# Patient Record
Sex: FEMALE | Race: WHITE | Employment: OTHER | URBAN - METROPOLITAN AREA
[De-identification: names, ages, dates, MRNs, and addresses within clinical notes are randomized per-mention and may not be internally consistent; named-entity substitution may affect disease eponyms.]

---

## 2017-09-26 ENCOUNTER — HOSPITAL ENCOUNTER (OUTPATIENT)
Dept: MAMMOGRAPHY | Age: 72
Discharge: HOME | End: 2017-09-26
Attending: NURSE PRACTITIONER
Payer: MEDICARE

## 2017-09-26 DIAGNOSIS — Z12.31 VISIT FOR SCREENING MAMMOGRAM: ICD-10-CM

## 2017-09-26 PROCEDURE — 77063 BREAST TOMOSYNTHESIS BI: CPT

## 2018-08-03 ENCOUNTER — HOSPITAL ENCOUNTER (OUTPATIENT)
Dept: NUTRITION | Age: 73
Discharge: HOME | End: 2018-08-03
Payer: MEDICARE

## 2018-08-03 PROCEDURE — 97802 MEDICAL NUTRITION INDIV IN: CPT

## 2018-08-06 NOTE — PROGRESS NOTES
[address] [place] [address] 51, 45 [facility], [place], 70 [facility]  Phone: [phone]  Fax: [phone]   Nutrition Assessment  Medical Nutrition Therapy   Outpatient Initial Evaluation         Patient Name: Sheila Givens : 1945   Treatment Diagnosis: DM2   Referral Source: Alverto Manning MD [facility]): 2018     Gender: female Age: 74 y.o. Ht: 66 in Wt:  157.8 lb  kg   BMI: 25.5 BMR   Male  BMR Female 1227   Anthropometrics Assessment: Pt is slightly overweight. Past Medical History includes: HTN, hypothyroidism, overweight, osteopenia     Pertinent Medications:   HCTZ, Diltiazem XR, Metformin, MVI, biotin, Osteo Bi-flex, Ocusight, Vit C, Calcium + D3, Jose probiotic     Biochemical Data:   No results found for: HBA1C, HGBE8, RZC5YMLF, VDO0FQJT  No results found for: CHOL, CHOLPOCT, CHOLX, CHLST, CHOLV, HDL, HDLPOC, LDL, LDLCPOC, LDLC, DLDLP, VLDLC, VLDL, TGLX, TRIGL, TRIGP, TGLPOCT, CHHD, CHHDX  No results found for: GPT, ALT, SGOT, GGT, GGTP, AP, APIT, APX, CBIL, TBIL, TBILI  No results found for: GRACIE, CREAPOC, ACREA, CREA, REFC3, REFC4  No results found for: BUN, BUNPOC, IBUN, MBUNV, BUNV  No results found for: MCACR, MCA1, MCA2, MCA3, MCAU, MCAU2, MCALPOCT     Subjective/Assessment:   Pt in today r/t elevated A1C. She states that she has a sugar addiction and needs to get her A1C under control. She has attended classes previously and states that she knows what to do but cannot control herself around sweets. For exercise, she was walking but has stopped that at this time. She doesn't usually eat out at restaurants. She has cut junk food out at night when she previously ate junk food in front of the television. Her last A1C was 7.2 and she also reports high BP. She lives alone and cooks for herself.      Current Eating Patterns: B: Bagel with melted cheese or oatmeal or eggs or chicken soup  L: sandwich on whole grain bread  D: sometimes cooks, frozen dinner 2 times/wk  She drinks low sodium V8 and water throughout the day. She also loves salad and will have only that for lunch or dinner sometimes. In the am, she has a cup of coffee with a splash of skim milk. Estimate Needs   Calories:  1400 Protein: 105 Carbs: 123 Fat: 54   Kcal/day  g/day  g/day  g/day        percent: 25  45  30               Education & Recommendations provided: Educated pt on the pathophysiology of Type II Diabetes and insulin resistance and the rationale for dietary modification and increased activity. Educated patient on all sources of carbohydrates found in foods and  encouraged  no more than 30-45 g/meal and 15-20 g/snack. Discussed the Healthy Plate Method and appropriate portion sizes of different food groups. Explained the importance of combining carbohydrates with protein at meals and reviewed foods that contain each nutrient. Emphasized the importance of consistent meal time intervals throughout the day to improve metabolism. Explained calorie density and empty calories to assist pt with understanding portion sizes and limiting excess calories. Discussed microbiome and the possible link to sugar cravings.     Handouts Provided: [x]  Carbohydrates  [x]  Protein  []  Fiber  []  Serving Sizes  [x]  Meal and Snack Ideas  []  Food Journals [x]  Diabetes  []  Cholesterol  [x]  Sodium  [x]  Gen Nutr Guidelines  []  SBGM Guidelines  [x]  Others: inflammation   Information Reviewed with: Pt   Readiness to Change Stage: []  Pre-contemplative    []  Contemplative  []  Preparation               [x]  Action                  []  Maintenance   Potential Barriers to Learning: []  Decline in memory    []  Language barrier   []  Other:  []  Emotional                  []  Limited mobility  []  Lack of motivation     [] Vision, hearing or cognitive impairment   Expected Compliance: Fair due to patient's inability to remain motivated but willingness to put forth the effort. Nutritional Goal - To promote lifestyle changes to result in:    []  Weight loss  [x]  Improved diabetic control  []  Decreased cholesterol levels  []  Decreased blood pressure  []  Weight maintenance []  Preventing any interactions associated with food allergies  []  Adequate weight gain toward goal weight  []  Other:        Patient Goals:  SMART goals 1. Limit frozen meals to twice/wk  2. Line up 4 bottles of water to reach goal of 64 oz per day (personal goal)  3. Begin walking for 30 minutes 3-5 days/wk  4. Start stretch band and/or 2 lb weights 2 times/wk  5. Eat meal or snack within 1-2 hours of waking  6. Eat something about every 4 hours  7.  Keep protein at about 30 g/meal and CHO 30-45 g/meal     Dietitian Signature: Diego Patel MS, RD Date: 8/6/2018   Follow-up: Fri Sep 7th @ 10:30 am Time: 10:26 AM

## 2018-09-07 ENCOUNTER — HOSPITAL ENCOUNTER (OUTPATIENT)
Dept: NUTRITION | Age: 73
Discharge: HOME | End: 2018-09-07
Payer: MEDICARE

## 2018-09-07 PROCEDURE — 97803 MED NUTRITION INDIV SUBSEQ: CPT

## 2018-09-07 NOTE — PROGRESS NOTES
NUTRITION  FOLLOW-UP TREATMENT NOTE Patient Name: Chano Nash         Date: 2018 : 1945    YES/NO Patient  Verified Diagnosis: DM2 In time:   10:30          Out time:   11:00 Total Treatment Time (min):   30 min [id number] Changes in medication or medical history? Any new allergies, surgeries or procedures? NO    If yes, update Summary List  
Pt in today for follow up. She has made many changes to her lifestyle in order to see a decrease in BS levels. Per pt, she has increased water intake, increased exercise, has been making healthier food choices, added a probiotic, and is now eating breakfast. She has not been keeping sweets in the house and plans to use the [facility] kitchen/freezer when baking so as not to store sweets that she states she will eat until she feels sick. Food recall: B: boiled egg with cantaloupe. L: HT salad bar with mozz. D: same. She has been drinking water and tea sweetened with Stevia. Current Wt: 157.4 Previous Wt: 157.8 Wt Change: -0.4# Achievement of Goals: 1. Limit frozen meals to twice per week = met, continue 2. Line up 4 bottles of water to reach goal of 64 oz per day = met, continue 3. Begin walking for 30 minutes 3-5 days/wk = met, continue 4. Start stretch band and/or 2# wts 2 times/wk = not met, continue 5. Eat meal or snack within 1-2 hours of waking = met, continue 6. Eat something about every 4 hrs = met, continue 7. Keep protein at about 30 g/meal and CHO 30-45 g/meal = met, revised (protein goal is now 15-30 g/meal) Patient Education:  [x]  Review current plan with patient  
[]  Other:   
Handouts/ Information Provided: []  Carbohydrates 
[]  Protein []  Fiber 
[]  Serving Sizes []  Fluids 
[]  General guidelines []  Diabetes []  Cholesterol 
[]  Sodium []  SBGM []  Food Journals 
[]  Others:   
 
New Patient Goals: 1. Call [employer] for price match on probiotics and protein shakes 2. Walk 30 minutes 3-5 days/wk (or yoga) 3. Stretch bands and/or 2# wts 2 days/wk PLAN [x]  Continue on current plan []  Follow-up PRN  
[]  Discharge due to :   
[x]  Next appt: Friday Oct 19th @ 10 am  
 
Dietitian: Gabriel Martell MS, RD Date: 9/7/2018 Time: 11:53 AM

## 2018-10-19 ENCOUNTER — HOSPITAL ENCOUNTER (OUTPATIENT)
Dept: NUTRITION | Age: 73
Discharge: HOME | End: 2018-10-19
Payer: MEDICARE

## 2018-10-19 PROCEDURE — 97803 MED NUTRITION INDIV SUBSEQ: CPT

## 2018-10-19 NOTE — PROGRESS NOTES
NUTRITION  FOLLOW-UP TREATMENT NOTE Patient Name: Merlin Stroud         Date: 10/19/2018 : 1945    YES/NO Patient  Verified Diagnosis: DM2 In time:   10            Out time:   10:30 Total Treatment Time (min):  30 min [address] Changes in medication or medical history? Any new allergies, surgeries or procedures? YES/NO    If yes, update Summary List  
Pt in today for follow up. Pt states that her A1C decreased to 6.8 (2018) from 7.2 (7/3/18). She has lost 5.6# since her last visit at the beginning of Sep. Per pt she continued to be serious about what she ate. She's been walking and doing yoga when she can't walk consistently. She has also increased her water intake. She has been eating 3 meals per day and 1 snack in the evening. Food recall: B: cottage cheese with fruit, walnuts, and cinnamon. L: tries to have salad with rotisserie chicken. D: if not salad will have 1/2 bagel with swiss cheese and saurkraut or chicken noodle soup (homemade). Current Wt: 151.8 Previous Wt: 157.4 Wt Change: -5.6# Achievement of Goals: 1. Call [employer] for price match on probiotics and protein shakes = met, discontinue (but continue on probiotics) 2. Walk 30 minutes 3-5 days/wk (or yoga) = met, continue 3. Stretch bands and/or 2# wts 2 days/wk = not met, continue Patient Education:  [x]  Review current plan with patient  
[]  Other:   
Handouts/ Information Provided: []  Carbohydrates 
[]  Protein []  Fiber 
[]  Serving Sizes []  Fluids 
[]  General guidelines []  Diabetes []  Cholesterol 
[]  Sodium []  SBGM []  Food Journals 
[x]  Others: Goals New Patient Goals: 1. Have protein before cooked or cold cereal (such as oatmeal) 2. Buy rotisserie chicken for salad 3. Add protein to ruel (cheese, seeds, nuts, or protein shake) 4. Add weights or stretch band 20 minutes 1-2 days/week PLAN [x]  Continue on current plan []  Follow-up PRN  
 []  Discharge due to :   
[x]  Next appt: 12/21/18 @ 10 am  
 
Dietitian: Fabiola Baird MS, RD Date: 10/19/2018 Time: 9:53 AM

## 2019-05-02 ENCOUNTER — HOSPITAL ENCOUNTER (OUTPATIENT)
Dept: PHYSICAL THERAPY | Age: 74
Discharge: HOME | End: 2019-05-02
Payer: MEDICARE

## 2019-05-02 PROCEDURE — 97162 PT EVAL MOD COMPLEX 30 MIN: CPT

## 2019-05-02 NOTE — PROGRESS NOTES
[facility] PHYSICAL THERAPY 
[address] [place] [address] Natasha Norris [address],[place], 70 [facility] - Phone: [phone]  Fax: [phone] PLAN OF CARE / STATEMENT OF MEDICAL NECESSITY FOR PHYSICAL THERAPY SERVICES Patient Name: Latoya Lowry : 1945 Medical  
Diagnosis: Unsteadiness on feet [R26.81] Treatment Diagnosis: Unsteadiness on feet [R26.81] Onset Date:  Referral Source: Karen Tineo MD [facility]): 2019 Prior Hospitalization: See medical history Provider #: [id number] Prior Level of Function: No difficulty with ambulation Comorbidities: HTN, DM Medications: Verified on Patient Summary List  
The Plan of Care and following information is based on the information from the initial evaluation.  
=========================================================================================== Assessment / key information:  Latoya Lowry is a 66 y.o.  yo female with Dx of Unsteadiness on feet [R26.81]. She reports the onset of dizziness/loss of balance after taking antibiotics in . She finished taking the antibiotics on 3/23/19, however continues to have difficulty with changing direction while walking. Objective Findings:  Manual Muscle Testing: B hip abd are Reduced. All other LE strength are WNL. Special Test: SL Balance: >10s bilaterally. Standing with with repetitive cervical rot;  C/o dizziness. Standing on 2 pillows with repetitive cervical rot; Unable to maintain balance. Pt instructed in HEP and will f/u in clinic for PT.=========================================================================================== Eval Complexity: History MEDIUM  Complexity : 1-2 comorbidities / personal factors will impact the outcome/ POC ;  Examination  MEDIUM Complexity : 3 Standardized tests and measures addressing body structure, function, activity limitation and / or participation in recreation ; Presentation MEDIUM Complexity : Evolving with changing characteristics ; Decision Making MEDIUM Complexity : FOTO score of 26-74; Overall Complexity MEDIUM Problem List: impaired gait/ balance Treatment Plan may include any combination of the following: Neuromuscular re-education, Gait/balance training, Manual therapy, Patient education, Self Care training and Home safety training Patient / Family readiness to learn indicated by: asking questions Persons(s) to be included in education: patient (P) Barriers to Learning/Limitations: None Measures taken, if barriers to learning:   
Patient Goal (s): Get back to walking Patient self reported health status: good Rehabilitation Potential: good ? Short Term Goals: To be accomplished in  1-2  weeks: 
1. Independent with HEP. 2. Will rate >/= +3 on Global Rating of Change to show overall improvement. ? Long Term Goals: To be accomplished in  3-4  weeks: 1. Pt will report >/= 75% improvement to assist with ADLs 2. Increase FOTO score by 20% to show functional improvment. 3.  Will rate >/= +5 on Global Rating of Change and be prepared to DC to HEP. Frequency / Duration:   Patient to be seen  2-3  times per week for 3-4  weeks: 
Patient / Caregiver education and instruction: self care and exercises Therapist Signature: Ashley Espinoza, DPT, OCS, SCS, CSCS Date: 5/2/2019 Certification Period: 5/2/19 - 7/31/19 Time: 9:26 AM  
=========================================================================================== I certify that the above Physical Therapy Services are being furnished while the patient is under my care. I agree with the treatment plan and certify that this therapy is necessary. Physician Signature:       Date:      Time:  Please sign and return to [facility] [place] or you may fax the signed copy to [phone]. Thank you.

## 2019-05-02 NOTE — PROGRESS NOTES
PHYSICAL THERAPY - DAILY TREATMENT NOTE Patient Name: Braulio Ramirez        Date: 2019 : 1945   YES Patient  Verified Visit #:     Insurance: Payor: Kathia Jones / Plan: Burt Rao / Product Type: Medicare / In time: 8:50 Out time: 9:05 Total Treatment Time: 15 Medicare Time Tracking (below) Total Timed Codes (min):  na 1:1 Treatment Time:  na  
TREATMENT AREA =  No admission diagnoses are documented for this encounter. SUBJECTIVE Pain Level (on 0 to 10 scale):   
Medication Changes/New allergies or changes in medical history, any new surgeries or procedures? NO    If yes, update Summary List  
Subjective Functional Status/Changes:  []  No changes reported SEE IE  
 
  
 
OBJECTIVE 
 
 
 min Patient Education:  YES  Reviewed HEP []  Progressed/Changed HEP based on: Other Objective/Functional Measures: SEE IE 
  
Post Treatment Pain Level (on 0 to 10) scale:    
ASSESSMENT Assessment/Changes in Function: SEE IE 
  
[]  See Progress Note/Recertification Patient will continue to benefit from skilled PT services to modify and progress therapeutic interventions, address functional mobility deficits, address ROM deficits, address strength deficits, analyze and address soft tissue restrictions, analyze and cue movement patterns, analyze and modify body mechanics/ergonomics and assess and modify postural abnormalities to attain remaining goals. Progress toward goals / Updated goals: PLAN 
[]  Upgrade activities as tolerated YES Continue plan of care  
[]  Discharge due to :   
[]  Other:   
 
Therapist: Mayra Gan, PT, OCS, SCS, CSCS Date: 2019 Time: 9:08 AM  
 
 
No future appointments.

## 2019-05-10 ENCOUNTER — APPOINTMENT (OUTPATIENT)
Dept: PHYSICAL THERAPY | Age: 74
End: 2019-05-10
Payer: MEDICARE

## 2019-05-13 ENCOUNTER — HOSPITAL ENCOUNTER (OUTPATIENT)
Dept: PHYSICAL THERAPY | Age: 74
Discharge: HOME | End: 2019-05-13
Payer: MEDICARE

## 2019-05-13 PROCEDURE — 97112 NEUROMUSCULAR REEDUCATION: CPT

## 2019-05-13 NOTE — PROGRESS NOTES
PHYSICAL THERAPY - DAILY TREATMENT NOTE Patient Name: Selvin Nassar        Date: 2019 : 1945   YES Patient  Verified Visit #:     Insurance: Payor: Sukhwinder Trammell / Plan[place] / Product Type: Medicare / In time: 10:55 Out time: 11:40 Total Treatment Time: 45 BCBS and Medicare Time Tracking (below) Total Timed Codes (min):  45 1:1 Treatment Time:  45 TREATMENT AREA =  Unsteadiness on feet [R26.81] SUBJECTIVE Pain Level (on 0 to 10 scale):  0  / 10 Medication Changes/New allergies or changes in medical history, any new surgeries or procedures? NO    If yes, update Summary List  
Subjective Functional Status/Changes:  []  No changes reported This happened a few months ago when I had a bladder infection called macrobid. My thoughts and words aren't matching. Its not constant. Im 99.5% sure its that drug and I had 2 serious falls in the mean time. Was in the ER last Wednesday. I was talking to a friend holding the phone, and the vacuum cord was between my feet and I fell forward onto the left side. My head isnt quite right, its moving and I'm not. X-ray for hip negative for fractures, CT scan of head Denies spinning, intermittent dizziness 45 min Neuro Re-education: Education in vestibular rehabilitation program, anatomy and physiology, VSE in sitting and purpose of vestibular adaptation exercise Rationale:      
 min Patient Education:  YES  Reviewed HEP []  Progressed/Changed HEP based on: Other Objective/Functional Measures: 
Physical Therapy Evaluation - Vestibular Posture:  Not Assessed C/S ROM: Not Assessed Strength:   [x] Limited  MANUEL Hip ABD Optional Tests: 
Sensation: Not Assessed TC Proprioception: Not Assessed TC Coordination Testing: Not Assessed TC Oculomotor Tests: (Fixation Present) Not Assessed TC 
 
BPPV Assessment:  Not Assessed TC 
  
BPPV Special Tests: Not Assessed TC 
 
 Vestibular Functional Assessment: 
     Dynamic Gait Index [] Neg     [x] Pos    Score: 15/24 (<18/24) Balance Standard Testing (Eyes Open/Eyes Closed - EO/EC) Romberg   [] [facility]    [] Pos    Describe: 30/30 Stand on Foam  [] [facility]    [] Pos    Describe: 30/5 Standing on Rail  [] [facility]    [] Pos    Describe: 30/6 Sharpened Romberg [] WFL    [] Pos    Describe: R 30/4; R00/9 Single Leg Stand  [] [facility]    [] Pos    Describe: 2/3 Post Treatment Pain Level (on 0 to 10) scale:   0  / 10 ASSESSMENTAssessment/Changes in Function: Pt presented to PT services with decreased MANUEL strength, flexibility, decreased static/dynamic balance, impaired/ataxic gait and would benefit from PT services in order to address the above impairments. []  See Progress Note/Recertification Patient will continue to benefit from skilled therapy to address remaining functional deficits: See POC Progress toward goals / Updated goals: 
See POC PLAN [x]  Upgrade activities as tolerated YES Continue plan of care  
[]  Discharge due to :   
[]  Other:   
 
Therapist: Amirta Muller Date: 5/13/2019 Time: 8:41 AM

## 2019-05-21 ENCOUNTER — HOSPITAL ENCOUNTER (OUTPATIENT)
Dept: PHYSICAL THERAPY | Age: 74
Discharge: HOME | End: 2019-05-21
Payer: MEDICARE

## 2019-05-21 PROCEDURE — 97112 NEUROMUSCULAR REEDUCATION: CPT

## 2019-05-21 PROCEDURE — 97116 GAIT TRAINING THERAPY: CPT

## 2019-05-21 NOTE — PROGRESS NOTES
PHYSICAL THERAPY - DAILY TREATMENT NOTE    Patient Name: Elise Alanis        Date: 2019  : 1945   YES Patient  Verified  Visit #:   3     10  Insurance: Payor: Rachel Deras / Plan: [place] & [place] / Product Type: Medicare /      In time: 8:54 Out time: 9:38   Total Treatment Time: 40     BCBS and Medicare Time Tracking (below)   Total Timed Codes (min):  44 1:1 Treatment Time:  44     TREATMENT AREA =  Unsteadiness on feet [R26.81]  SUBJECTIVE  Pain Level (on 0 to 10 scale):  0  / 10   Medication Changes/New allergies or changes in medical history, any new surgeries or procedures? NO    If yes, update Summary List   Subjective Functional Status/Changes:  []  No changes reported     Pt states \"I started to notice that the forward bending makes me feel dizzy\"         OBJECTIVE  Modalities Rationale:   N/a    32 min Neuro Re-education: Static standing balance EO/EC   Rationale:   Improve standing proprioception to improve patients ability to balance in the shower and stand at night    12 min Gait Training: Ambulation with head movements, added BKW, amb with HHT/VHT   Rationale:    Improve gait mechanics to walk independently and safely in the grocery store         min Patient Education:  YES  Reviewed HEP   []  Progressed/Changed HEP based on: Other Objective/Functional Measures: Added VSE/VVI, static standing balance, and gait training   Post Treatment Pain Level (on 0 to 10) scale:   0  / 10     ASSESSMENT  Assessment/Changes in Function:     Added VSE in sitting with good tolerance. VC for amount of head turn and speed. VC in for proper form for VVI for improved coordination.  Increaed M/L sway with heel to bunion EC.    []  See Progress Note/Recertification   Patient will continue to benefit from skilled PT services to modify and progress therapeutic interventions, address functional mobility deficits, address ROM deficits, address strength deficits, analyze and address soft tissue restrictions, analyze and cue movement patterns, analyze and modify body mechanics/ergonomics and assess and modify postural abnormalities to attain remaining goals. Progress toward goals / Updated goals:    First visit after initial evaluation. Progress tx per POC.         PLAN  [x]  Upgrade activities as tolerated YES Continue plan of care   []  Discharge due to :    []  Other:      Therapist: Park De Jesus DPT     Date: 5/21/2019 Time: 9:17 AM     Future Appointments   Date Time Provider Elle Valencia   5/23/2019  9:15 AM [facility]   5/28/2019  8:15 AM [facility]   6/4/2019  3:30 PM [facility]   6/11/2019  8:30 AM [facility]   6/18/2019  8:30 AM [facility]   6/25/2019  8:30 AM [facility]

## 2019-05-23 ENCOUNTER — HOSPITAL ENCOUNTER (OUTPATIENT)
Dept: PHYSICAL THERAPY | Age: 74
Discharge: HOME | End: 2019-05-23
Payer: MEDICARE

## 2019-05-23 PROCEDURE — 97116 GAIT TRAINING THERAPY: CPT

## 2019-05-23 PROCEDURE — 97112 NEUROMUSCULAR REEDUCATION: CPT

## 2019-05-23 NOTE — PROGRESS NOTES
PHYSICAL THERAPY - DAILY TREATMENT NOTE    Patient Name: Chase Sorensen        Date: 2019  : 1945   YES Patient  Verified  Visit #:   4  of   10  Insurance: Payor: Yusef Drafts / Plan: [place] & [place] / Product Type: Medicare /      In time: 9:16 Out time: 9:49   Total Treatment Time: 35     BCBS and Medicare Time Tracking (below)   Total Timed Codes (min):  33 1:1 Treatment Time:  33     TREATMENT AREA =  Unsteadiness on feet [R26.81]  SUBJECTIVE  Pain Level (on 0 to 10 scale):  0  / 10   Medication Changes/New allergies or changes in medical history, any new surgeries or procedures? NO    If yes, update Summary List   Subjective Functional Status/Changes:  []  No changes reported     Pt states \"I' am just a little wobbly today and thinking ill bring the cane with me, by the time I finished the eye exercises this morning and I did feel a little dizzy in my head than usual\"         OBJECTIVE  Modalities Rationale:   N/a    21 min Neuro Re-education: Static standing balance EO/EC   Rationale:   Improve standing proprioception to improve patients ability to balance in the shower and stand at night    12 min Gait Training: Ambulation with head movements, added mod tandem   Rationale:    Improve gait mechanics to walk independently and safely in the grocery store        min Patient Education:  YES  Reviewed HEP   []  Progressed/Changed HEP based on: Other Objective/Functional Measures: Added modified tandem gait   Post Treatment Pain Level (on 0 to 10) scale:   0  / 10     ASSESSMENT  Assessment/Changes in Function:     Held VVI and updated HEP to VSE only secondary to improved tolerance. Improved performance on MSR heel to bunion EC able to maintain full duration with decreased trunk sway. Progressed 2\" to romberg on foam EC with increased challenge and left trunk sway.      []  See Progress Note/Recertification   Patient will continue to benefit from skilled PT services to modify and progress therapeutic interventions, address functional mobility deficits, address ROM deficits, address strength deficits, analyze and address soft tissue restrictions, analyze and cue movement patterns, analyze and modify body mechanics/ergonomics and assess and modify postural abnormalities to attain remaining goals. Progress toward goals / Updated goals:    Progressing towards STG 1.   1. Independent with HEP.  Goal in progress - HEP established and issued (5/21/19)       PLAN  [x]  Upgrade activities as tolerated YES Continue plan of care   []  Discharge due to :    []  Other:      Therapist: Natalie Thorne DPT     Date: 5/23/2019 Time: 9:21 AM     Future Appointments   Date Time Provider Elle Valencia   5/28/2019  8:15 AM [facility]   6/4/2019  3:30 PM [facility]   6[facility]11[facility]   6/18/2019  8:30 AM [facility]   6/25/2019  8:30 AM [facility]

## 2019-05-28 ENCOUNTER — HOSPITAL ENCOUNTER (OUTPATIENT)
Dept: PHYSICAL THERAPY | Age: 74
Discharge: HOME | End: 2019-05-28
Payer: MEDICARE

## 2019-05-28 PROCEDURE — 97112 NEUROMUSCULAR REEDUCATION: CPT

## 2019-05-28 PROCEDURE — 97116 GAIT TRAINING THERAPY: CPT

## 2019-05-28 NOTE — PROGRESS NOTES
[address] PHYSICAL THERAPY  [address] -   Phone: [phone]  Fax: [phone]  [x]  PROGRESS NOTE  []   [facility] SUMMARY  Patient Name: Chandra Alfaro : 1945   Treatment Diagnosis: Unsteadiness on feet [R26.81]     Referral Source: Shraddha Torrez MD     Date of Initial Visit: 2019 Attended Visits: 5 Missed Visits: 0     SUMMARY OF TREATMENT  Therapeutic exercises including strengthening, vestibular adaptation card exercises, balance training, gait training, fall prevention strategies, and HEP instruction. CURRENT STATUS  The pt has progressed slowly but well with therapy, consistently reporting decreased dizziness and improved stability with functional mobility. Currently, the patient's main complaint is assistance from family member or AD with ambulation in crowded areas for safety. Patient continues to demonstrate impaired use of vestibular input and hip/ankle balance strategies. Patient scored 21/30 on FGA indicating increased risk of falls with ambulation. Patient scored 22/100 on DHI indcating decreased functional mobility and quality of life secondary to dizziness. Pt reports improved ability to safely transfer in and out of shower and perform household ambulation without SPC since starting PT services. Pt would benefit from continued PT services in order to decrease dizziness, improve static/dynamic balance, gait training, and address remaining impairments. Goal/Measure of Progress Goal Met? 1.  Establish HEP to prevent further disability. Status at last Eval: Goal Established Current Status: I with HEP               yes   2. Will rate >/= +3 on Global Rating of Change to show overall improvement. Status at last Eval: Goal Established Current Status: (+2) A little Better Progressing      New Goals to be achieved in __3-4__  Weeks:  1.   Patient to be independent & compliant with progressive HEP and vestibular taper in preparation for D/C.   2.  Patient will be able to maintain MSR heel to bunion for 30 seconds eyes closed to increase safety with showering. 4.  Patient to improve score on FGA to 24/30 indicating decreased fall risk with ambulation. 5.  Increase FOTO score by 20% to show functional improvment. 6.  Patient will be able to maintain Rail stance for >/=15 seconds eyes closed to increase safety with showering. RECOMMENDATIONS  Continue therapy with the following recommendations: 1-2x per week for 4-5 weeks    If you have any questions/comments please contact us directly at [phone]   Thank you for allowing us to assist in the care of your patient. Therapist Signature: Inez Priest DPT Date: 5/28/2019     Time: 8:24 AM   NOTE TO PHYSICIAN:  PLEASE COMPLETE THE ORDERS BELOW AND FAX TO   [facility]: [phone]  If you are unable to process this request in 24 hours please contact our office: [phone]    ___ I have read the above report and request that my patient continue as recommended.   ___ I have read the above report and request that my patient continue therapy with the following changes/special instructions:_________________________________________________________   ___ I have read the above report and request that my patient be discharged from therapy.      Physician Signature:        Date:       Time:

## 2019-05-28 NOTE — PROGRESS NOTES
PHYSICAL THERAPY - DAILY TREATMENT NOTE    Patient Name: Karey Araiza        Date: 2019  : 1945   YES Patient  Verified  Visit #:   4  of   10  Insurance: Payor: Celina Mauricio / Plan: [place] MEDICARE PART A & B / Product Type: Medicare /      In time: 8:16 Out time: 9:18   Total Treatment Time: 58     BCBS and Medicare Time Tracking (below)   Total Timed Codes (min):  62 1:1 Treatment Time:  62     TREATMENT AREA =  Unsteadiness on feet [R26.81]  SUBJECTIVE  Pain Level (on 0 to 10 scale):  0  / 10   Medication Changes/New allergies or changes in medical history, any new surgeries or procedures? NO    If yes, update Summary List   Subjective Functional Status/Changes:  []  No changes reported     Pt states \"I' am light headed, my BP might be up bc im anxious\"         OBJECTIVE  Modalities Rationale:   N/a    46 min Neuro Re-education: Static standing balance EO/EC   Rationale:   Improve standing proprioception to improve patients ability to balance in the shower and stand at night    16 min Gait Training: Ambulation with head movements, added mod tandem   Rationale:    Improve gait mechanics to walk independently and safely in the grocery store      min Patient Education:  YES  Reviewed HEP   []  Progressed/Changed HEP based on: Other Objective/Functional Measures:     BP = 120/70   Post Treatment Pain Level (on 0 to 10) scale:   0  / 10     ASSESSMENT  Assessment/Changes in Function:     See PN     []  See Progress Note/Recertification   Patient will continue to benefit from skilled PT services to modify and progress therapeutic interventions, address functional mobility deficits, address ROM deficits, address strength deficits, analyze and address soft tissue restrictions, analyze and cue movement patterns, analyze and modify body mechanics/ergonomics and assess and modify postural abnormalities to attain remaining goals.    Progress toward goals / Updated goals:    See PN       PLAN  [x] Upgrade activities as tolerated YES Continue plan of care   []  Discharge due to :    []  Other:      Therapist: Nancy Adams DPT     Date: 5/28/2019 Time: 9:21 AM     Future Appointments   Date Time Provider Elle Valencia   6/4/2019  3:30 PM Ashok Flores [facility]   6/11/2019  8:30 AM Anthony Romano PTA [facility]   6/18/2019  8:30 AM Anthony Romano PTA [facility]   6/25/2019  8:30 AM [facility] PT SHANDA 2 [facility]

## 2019-06-04 ENCOUNTER — HOSPITAL ENCOUNTER (OUTPATIENT)
Dept: PHYSICAL THERAPY | Age: 74
Discharge: HOME | End: 2019-06-04
Payer: MEDICARE

## 2019-06-04 PROCEDURE — 97112 NEUROMUSCULAR REEDUCATION: CPT

## 2019-06-04 PROCEDURE — 97116 GAIT TRAINING THERAPY: CPT

## 2019-06-04 NOTE — PROGRESS NOTES
PHYSICAL THERAPY - DAILY TREATMENT NOTE    Patient Name: Charlotte Llanes        Date: 2019  : 1945   YES Patient  Verified  Visit #:   (0) 1  of   8-10  Insurance: Payor: Darron Many / Plan: [place] MEDICARE PART A & B / Product Type: Medicare /      In time: 3:32 pm Out time: 4:00pm   Total Treatment Time: 29     BCBS and Medicare Time Tracking (below)   Total Timed Codes (min):  28 1:1 Treatment Time:  28     TREATMENT AREA =  Unsteadiness on feet [R26.81]  SUBJECTIVE  Pain Level (on 0 to 10 scale):  0  / 10   Medication Changes/New allergies or changes in medical history, any new surgeries or procedures? NO    If yes, update Summary List   Subjective Functional Status/Changes:  []  No changes reported     Pt states \"Ewa started walking I stick close to home and carry the cane, Im feel like I'm doing better\"         OBJECTIVE  Modalities Rationale:   N/a    20 min Neuro Re-education: Static standing balance EO/EC   Rationale:   Improve standing proprioception to improve patients ability to balance in the shower and stand at night    8 min Gait Training: Ambulation with head movements, added amb with EC and lateral ladi navigation    Rationale:    Improve gait mechanics to walk independently and safely in the grocery store       min Patient Education:  YES  Reviewed HEP   []  Progressed/Changed HEP based on: Other Objective/Functional Measures:     SR tennis ball toss   Post Treatment Pain Level (on 0 to 10) scale:   0  / 10     ASSESSMENT  Assessment/Changes in Function:     Progressed vestibular adaptation to standing with background. Good tolerance for progression into heel to bunion EC, however, increased hip strategy.       []  See Progress Note/Recertification   Patient will continue to benefit from skilled PT services to modify and progress therapeutic interventions, address functional mobility deficits, address ROM deficits, address strength deficits, analyze and address soft tissue restrictions, analyze and cue movement patterns, analyze and modify body mechanics/ergonomics and assess and modify postural abnormalities to attain remaining goals. Progress toward goals / Updated goals:    Progressing towards newly established LTGs.       PLAN  [x]  Upgrade activities as tolerated YES Continue plan of care   []  Discharge due to :    []  Other:      Therapist: Amparo Rodas DPT     Date: 6/4/2019 Time: 1:38 PM     Future Appointments   Date Time Provider Elle Valencia   6/4/2019  3:30 PM Ascencion Freitas [facility]   6/11/2019  8:30 AM Larry Robles PTA [facility]   6/18/2019  8:30 AM Larry Robles PTA [facility]   6/25/2019  8:30 AM [facility] PT SHANDA 2 [facility]

## 2019-06-11 ENCOUNTER — APPOINTMENT (OUTPATIENT)
Dept: PHYSICAL THERAPY | Age: 74
End: 2019-06-11
Payer: MEDICARE

## 2019-06-13 ENCOUNTER — HOSPITAL ENCOUNTER (OUTPATIENT)
Dept: PHYSICAL THERAPY | Age: 74
Discharge: HOME | End: 2019-06-13
Payer: MEDICARE

## 2019-06-13 PROCEDURE — 97116 GAIT TRAINING THERAPY: CPT

## 2019-06-13 PROCEDURE — 97112 NEUROMUSCULAR REEDUCATION: CPT

## 2019-06-13 NOTE — PROGRESS NOTES
PHYSICAL THERAPY - DAILY TREATMENT NOTE    Patient Name: Chano Nash        Date: 2019  : 1945   YES Patient  Verified  Visit #:   (8) 6  of   8-10  Insurance: Payor: Madelyn Beard / Plan: [place] MEDICARE PART A & B / Product Type: Medicare /      In time: 3:30 pm Out time: 4:13 pm   Total Treatment Time: 37     BCBS and Medicare Time Tracking (below)   Total Timed Codes (min):  43 1:1 Treatment Time: 34     TREATMENT AREA =  Unsteadiness on feet [R26.81]  SUBJECTIVE  Pain Level (on 0 to 10 scale):  0  / 10   Medication Changes/New allergies or changes in medical history, any new surgeries or procedures? NO    If yes, update Summary List   Subjective Functional Status/Changes:  []  No changes reported     Pt states \"im doing good I started walking again\"         OBJECTIVE  Modalities Rationale:   N/a    35  (bill 26 ) min Neuro Re-education: Static standing balance EO/EC   Rationale:   Improve standing proprioception to improve patients ability to balance in the shower and stand at night    8 min Gait Training: Ambulation with head movements, added amb with EC and lateral ladi navigation    Rationale:    Improve gait mechanics to walk independently and safely in the grocery store     Billed With/As:   [] TE   [] TA   [x] Neuro   [] Self Care Patient Education: [x] Review HEP    [] Progressed/Changed HEP based on:   [] positioning   [] body mechanics   [] transfers   [] heat/ice application    [] other:       min Patient Education:  YES  Reviewed HEP   []  Progressed/Changed HEP based on: Other Objective/Functional Measures: Added nu step warm up and 2 way hip standing (no hands)   Post Treatment Pain Level (on 0 to 10) scale:   0  / 10     ASSESSMENT  Assessment/Changes in Function:     Progressed vestibular adaptation to standing with background. Progressed dynamic balance in standing with increased challenge on R LE.  Progressed SR to SLS arms at sides with increased ankle strategy []  See Progress Note/Recertification   Patient will continue to benefit from skilled PT services to modify and progress therapeutic interventions, address functional mobility deficits, address ROM deficits, address strength deficits, analyze and address soft tissue restrictions, analyze and cue movement patterns, analyze and modify body mechanics/ergonomics and assess and modify postural abnormalities to attain remaining goals. Progress toward goals / Updated goals:    Progressing towards LTG 2.   2. Patient will be able to maintain MSR heel to bunion for 30 seconds eyes closed to increase safety with showering.  Goal Met - MSR heel to GT EC = 30/30 sec     PLAN  [x]  Upgrade activities as tolerated YES Continue plan of care   []  Discharge due to :    []  Other:      Therapist: Jose Francisco Gomez DPT     Date: 6/13/2019 Time: 1:38 PM     Future Appointments   Date Time Provider Elle Valencia   6/13/2019  3:30 PM [address]   6/18/2019  9:30 AM [address]

## 2019-06-18 ENCOUNTER — APPOINTMENT (OUTPATIENT)
Dept: PHYSICAL THERAPY | Age: 74
End: 2019-06-18
Payer: MEDICARE

## 2019-06-18 ENCOUNTER — HOSPITAL ENCOUNTER (OUTPATIENT)
Dept: PHYSICAL THERAPY | Age: 74
Discharge: HOME | End: 2019-06-18
Payer: MEDICARE

## 2019-06-18 PROCEDURE — 97112 NEUROMUSCULAR REEDUCATION: CPT

## 2019-06-18 PROCEDURE — 97116 GAIT TRAINING THERAPY: CPT

## 2019-06-18 NOTE — PROGRESS NOTES
PHYSICAL THERAPY - DAILY TREATMENT NOTE    Patient Name: Gary Powers        Date: 2019  : 1945   YES Patient  Verified  Visit #:   (5) 3 of   8-10  Insurance: Payor: Hawk White / Plan: [place] MEDICARE PART A & B / Product Type: Medicare /      In time: 9:30 am Out time: 10:12 am   Total Treatment Time: 43     BCBS and Medicare Time Tracking (below)   Total Timed Codes (min):  42 1:1 Treatment Time: 42     TREATMENT AREA =  Unsteadiness on feet [R26.81]  SUBJECTIVE  Pain Level (on 0 to 10 scale):  0  / 10   Medication Changes/New allergies or changes in medical history, any new surgeries or procedures? NO    If yes, update Summary List   Subjective Functional Status/Changes:  []  No changes reported     Pt states \"I feel much better than I have, I feel like I am where I was before the anitbiotic got my confidence back\"         OBJECTIVE  Modalities Rationale:   N/a    30 min Neuro Re-education: Static standing balance EO/EC   Rationale:   Improve standing proprioception to improve patients ability to balance in the shower and stand at night    12 min Gait Training: Ambulation with head movements, added amb with EC and lateral ladi navigation    Rationale:    Improve gait mechanics to walk independently and safely in the grocery store     Billed With/As:   [] TE   [] TA   [x] Neuro   [] Self Care Patient Education: [x] Review HEP    [] Progressed/Changed HEP based on:   [] positioning   [] body mechanics   [] transfers   [] heat/ice application    [] other:       min Patient Education:  YES  Reviewed HEP   []  Progressed/Changed HEP based on:         Other Objective/Functional Measures:     Progressed romberg on foam to heel to arch EC   Post Treatment Pain Level (on 0 to 10) scale:   0  / 10     ASSESSMENT  Assessment/Changes in Function:     Patient has progressed well with PT services consistently reporting improved balance and functional mobility, thus patient is appropriate for D/C to home mgt of balance and gait training next visit. Discussed discharge planning, with patient reported agreeance. []  See Progress Note/Recertification   Patient will continue to benefit from skilled PT services to modify and progress therapeutic interventions, address functional mobility deficits, address ROM deficits, address strength deficits, analyze and address soft tissue restrictions, analyze and cue movement patterns, analyze and modify body mechanics/ergonomics and assess and modify postural abnormalities to attain remaining goals. Progress toward goals / Updated goals:    Progressing towards LTG 2.   2. Patient will be able to maintain MSR heel to bunion for 30 seconds eyes closed to increase safety with showering.  Goal Met - MSR heel to GT EC = 30/30 sec     PLAN  [x]  Upgrade activities as tolerated YES Continue plan of care   []  Discharge due to :    []  Other:      Therapist: Magaly Farley DPT     Date: 6/18/2019 Time: 1:38 PM     Future Appointments   Date Time Provider Elle Valencia   6/18/2019  9:30 AM Wilma Mallory

## 2019-06-25 ENCOUNTER — APPOINTMENT (OUTPATIENT)
Dept: PHYSICAL THERAPY | Age: 74
End: 2019-06-25
Payer: MEDICARE

## 2019-07-02 ENCOUNTER — HOSPITAL ENCOUNTER (OUTPATIENT)
Dept: PHYSICAL THERAPY | Age: 74
Discharge: HOME | End: 2019-07-02
Payer: MEDICARE

## 2019-07-02 PROCEDURE — 97116 GAIT TRAINING THERAPY: CPT

## 2019-07-02 PROCEDURE — 97112 NEUROMUSCULAR REEDUCATION: CPT

## 2019-07-02 NOTE — PROGRESS NOTES
PHYSICAL THERAPY - DAILY TREATMENT NOTE    Patient Name: Keny Banerjee        Date: 2019  : 1945   YES Patient  Verified  Visit #:   (4) 7   of   8-10  Insurance: Payor: Crystal Rosa / Plan: [place] MEDICARE PART A & B / Product Type: Medicare /      In time: 11:12 Out time: 12:02   Total Treatment Time: 50     Medicare/BCBS Time Tracking (below)   Total Timed Codes (min):  50 1:1 Treatment Time:  43     TREATMENT AREA =  Unsteadiness on feet [R26.81]    SUBJECTIVE  Pain Level (on 0 to 10 scale):  0  / 10   Medication Changes/New allergies or changes in medical history, any new surgeries or procedures?    no  If yes, update Summary List   Subjective Functional Status/Changes:  []  No changes reported     My leg has been hurting me          OBJECTIVE    38  (bill 31) min Neuro Re-education: Static standing balance EO/EC, education in vestibular taper   Rationale:   Improve standing proprioception to improve patients ability to balance in the shower and stand at night     12 min Gait Training: Ambulation during FGA Reassessment   Rationale:    Improve gait mechanics to walk independently and safely in the grocery store       Billed With/As:   [x] TE   [] TA   [] Neuro   [] Self Care Patient Education: [x] Review HEP    [] Progressed/Changed HEP based on:   [] positioning   [] body mechanics   [] transfers   [] heat/ice application    [] other:      Other Objective/Functional Measures:    FOTO = 92/100     Post Treatment Pain Level (on 0 to 10) scale:   0  / 10     ASSESSMENT  Assessment/Changes in Function:     See DC     []  See Progress Note/Recertification   Patient will continue to benefit from skilled PT services to modify and progress therapeutic interventions, address functional mobility deficits, address ROM deficits, address strength deficits, analyze and address soft tissue restrictions, analyze and cue movement patterns, analyze and modify body mechanics/ergonomics, assess and modify postural abnormalities and instruct in home and community integration to attain remaining goals.    Progress toward goals / Updated goals:    See DC     PLAN  [x]  Upgrade activities as tolerated Yes HEP for discharge to home mgt of sx   [x]  Discharge due to : Progressing towards or met all goals   []  Other:      Therapist: Prem Westfall    Date: 7/2/2019 Time: 8:49 AM     Future Appointments   Date Time Provider Elle Valencia   7/2/2019 11:00 AM Iris Pastor

## 2019-07-02 NOTE — PROGRESS NOTES
[facility] PHYSICAL THERAPY  [address] [place] [address], [address],[facility], [address] [facility] - Phone: [phone]  Fax: [phone]  DISCHARGE SUMMARY FOR PHYSICAL THERAPY          Patient Name: Vashti Stock : 1945   Treatment/Medical Diagnosis: Unsteadiness on feet [R26.81]   Onset Date: 3/2019    Referral Source: Abdirashid Dietz MD Start of Care [facility]): 2019   Prior Hospitalization: See Medical History Provider #: [id number]   Prior Level of Function: No difficulty with ambulation    Comorbidities: HTN and DM   Medications: Verified on Patient Summary List   Visits from [facility]: 9 Missed Visits: 0     Goal/Measure of Progress Goal Met? 1.  Establish HEP to prevent further disability. Status at last Eval: Goal Established Current Status: I with HEP and education in vestibular taper yes   2. Patient will be able to maintain MSR heel to bunion for 30 seconds eyes closed to increase safety with showering. Status at last Eval: MSR heel to bunion EC = 28/ Current Status: MSR heel to bunion EC = 30/30 yes   3. Patient will be able to maintain Rail stance for >/=15 seconds eyes closed to increase safety with showering. Status at last Eval: Goal Established Current Status: Rail EC = 4 sec no   4.   Increase FOTO score by 20% to show functional improvment. Status at last Eval: FOTO = 45/100 Current Status: FOTO = 92/100 yes   5. Patient to improve score on FGA to 24/30 indicating decreased fall risk with ambulation. Status at last Eval: FGA = 15/30 Current Status: FGA = 26/30 yes     Key Functional Changes/Progress: The pt has progressed well with therapy, consistently reporting decreased dizziness and increased confidence with functional ability. Pt reports >/=90% improvement in safety with balance and gait since initiating PT services.  Based on progress from PT services and pt reported improvement, patient is appropriate for DC to home mgt of sx at this time  Assessments/Recommendations: Discontinue therapy. Progressing towards or have reached established goals. If you have any questions/comments please contact us directly at  [phone]. Thank you for allowing us to assist in the care of your patient.     Therapist Signature: Jessica Carrera Date: 7/2/2019   Reporting Period: 5/2/2019 to 7/2/2019 Time: 8:09 AM